# Patient Record
Sex: FEMALE | Race: WHITE | NOT HISPANIC OR LATINO | Employment: OTHER | URBAN - NONMETROPOLITAN AREA
[De-identification: names, ages, dates, MRNs, and addresses within clinical notes are randomized per-mention and may not be internally consistent; named-entity substitution may affect disease eponyms.]

---

## 2020-09-02 ENCOUNTER — OFFICE VISIT (OUTPATIENT)
Dept: CARDIOLOGY | Facility: CLINIC | Age: 75
End: 2020-09-02

## 2020-09-02 VITALS
BODY MASS INDEX: 24.04 KG/M2 | SYSTOLIC BLOOD PRESSURE: 130 MMHG | DIASTOLIC BLOOD PRESSURE: 81 MMHG | OXYGEN SATURATION: 99 % | HEART RATE: 64 BPM | HEIGHT: 68 IN | WEIGHT: 158.6 LBS

## 2020-09-02 DIAGNOSIS — R06.02 SHORTNESS OF BREATH: ICD-10-CM

## 2020-09-02 DIAGNOSIS — R42 DIZZINESS: ICD-10-CM

## 2020-09-02 DIAGNOSIS — R07.2 PRECORDIAL PAIN: ICD-10-CM

## 2020-09-02 DIAGNOSIS — R00.2 PALPITATIONS: ICD-10-CM

## 2020-09-02 DIAGNOSIS — E78.2 MIXED HYPERLIPIDEMIA: Primary | ICD-10-CM

## 2020-09-02 PROBLEM — E03.9 HYPOTHYROIDISM: Status: ACTIVE | Noted: 2020-09-02

## 2020-09-02 PROBLEM — K21.9 GERD (GASTROESOPHAGEAL REFLUX DISEASE): Status: ACTIVE | Noted: 2020-09-02

## 2020-09-02 PROCEDURE — 99204 OFFICE O/P NEW MOD 45 MIN: CPT | Performed by: INTERNAL MEDICINE

## 2020-09-02 PROCEDURE — 93000 ELECTROCARDIOGRAM COMPLETE: CPT | Performed by: INTERNAL MEDICINE

## 2020-09-02 RX ORDER — PANTOPRAZOLE SODIUM 40 MG/1
40 TABLET, DELAYED RELEASE ORAL DAILY PRN
COMMUNITY

## 2020-09-02 RX ORDER — ASPIRIN 81 MG/1
81 TABLET ORAL NIGHTLY PRN
COMMUNITY

## 2020-09-02 RX ORDER — PRAVASTATIN SODIUM 40 MG
TABLET ORAL NIGHTLY
COMMUNITY
Start: 2020-06-29

## 2020-09-02 RX ORDER — LEVOTHYROXINE SODIUM 0.1 MG/1
TABLET ORAL DAILY
COMMUNITY
Start: 2020-06-29

## 2020-09-02 RX ORDER — CYANOCOBALAMIN 1000 UG/ML
INJECTION, SOLUTION INTRAMUSCULAR; SUBCUTANEOUS
COMMUNITY
Start: 2020-08-11

## 2020-09-02 NOTE — PROGRESS NOTES
Subjective   Maricruz Bautista is a 75 y.o. female     Chief Complaint   Patient presents with   • Establish Care     Here for eval.   • Shortness of Breath   • Palpitations       PROBLEM LIST:     1. Shortness of breath  2. Palpitations  3. Hyperlipidemia  4. Hypothyroidism  5. GERD  6. Chest pain/pressure/sharp    Denies scarlet/rheumatic fever      Specialty Problems     None            HPI:  Ms. Maricruz Bautista is a 75-year-old female patient of [facility] seen today for evaluation of chest pain and palpitations.    Approximately 3 to 4 weeks ago the patient developed a sinus infection which was associated with an earache and imbalance.  At that time she developed chest discomfort.  She describes a retrosternal pressure-like discomfort which was not associated with nausea, diaphoresis, or shortness of breath.  Ms. Bautista noted her symptoms mostly when she was coughing or moving rapidly.  She was treated for her sinus infection and all of her symptoms resolved.  The patient describes having had similar symptoms with episodes of sinus infection in the past.    Prior to several weeks ago the patient was in her usual state of health and was able to mow her lawn perform all housework and yard work without difficulty.  In particular, she never has had exertional chest discomfort.  She also denies orthopnea, PND, and she describes only very mild lower extremity edema present only at the end of the day when she has been standing for prolonged periods of time.  Although the patient has by history of palpitations she describes no palpitations currently.  She has dizziness and perhaps one episode of vertigo but these symptoms only occur with sinus infections.  The patient has had no symptoms of TIA, stroke, or other symptoms of arterial embolic events and she describes no claudication or other symptoms of peripheral arterial disease.    Ms. Bautista describes having a chemical stress test done for 5 years ago in [facility]  for similar symptoms, and she was told she had no evidence of ischemia.                        PRIOR MEDICATIONS    Current Outpatient Medications on File Prior to Visit   Medication Sig Dispense Refill   • Ascorbic Acid (VITAMIN C PO) Take 2,000 mg by mouth Daily.     • aspirin (aspirin) 81 MG EC tablet Take 81 mg by mouth At Night As Needed (for sleep).     • cyanocobalamin 1000 MCG/ML injection Every 30 (Thirty) Days.     • levothyroxine (SYNTHROID, LEVOTHROID) 100 MCG tablet Daily.     • pantoprazole (PROTONIX) 40 MG EC tablet Take 40 mg by mouth Daily As Needed.     • pravastatin (PRAVACHOL) 40 MG tablet Every Night.       No current facility-administered medications on file prior to visit.        ALLERGIES:    Codeine and Sulfa antibiotics    PAST MEDICAL HISTORY:    Past Medical History:   Diagnosis Date   • GERD (gastroesophageal reflux disease)    • Hyperlipidemia    • Hypothyroidism        SURGICAL HISTORY:    Past Surgical History:   Procedure Laterality Date   • APPENDECTOMY     • CYST REMOVAL      ovary   • HYSTERECTOMY     • OTHER SURGICAL HISTORY      removed fluid that had collected on outside of kidneys   • TUBAL ABDOMINAL LIGATION         SOCIAL HISTORY:    Social History     Socioeconomic History   • Marital status:      Spouse name: Not on file   • Number of children: Not on file   • Years of education: Not on file   • Highest education level: Not on file   Tobacco Use   • Smoking status: Former Smoker   • Smokeless tobacco: Never Used   Substance and Sexual Activity   • Alcohol use: Never     Frequency: Never   • Drug use: Never       FAMILY HISTORY:    Family History   Problem Relation Age of Onset   • Hypertension Mother    • Hyperlipidemia Mother    • Heart failure Mother    • Heart attack Father    • Hyperlipidemia Father        Review of Systems   Constitutional: Positive for diaphoresis (wakes up at night with it) and fatigue (if doesn't get B12 injection). Negative for chills,  "fever and unexpected weight change.   HENT:        Sinus issues   Eyes: Positive for visual disturbance (wears glasses).   Respiratory: Positive for shortness of breath.         Denies orthopnea/PND  Confirms can climb a flight of stairs without difficulty   Cardiovascular: Positive for palpitations and leg swelling (if up to long, quickly resolves with elevating feet). Negative for chest pain.   Gastrointestinal: Negative for blood in stool (no melena,hemoptysis,hematochezia), constipation and diarrhea.   Endocrine: Positive for cold intolerance.   Genitourinary: Negative.    Musculoskeletal: Positive for arthralgias and myalgias.        Denies leg cramps with ambulation   Skin: Negative.    Allergic/Immunologic: Positive for environmental allergies.   Neurological: Positive for dizziness (r/t illness) and headaches (r/t sinuses).        Denies stroke like sx's   Hematological: Bruises/bleeds easily (both).   Psychiatric/Behavioral: Negative.        VISIT VITALS:  Vitals:    09/02/20 1044 09/02/20 1055   BP: 140/71 130/81   BP Location: Left arm Left arm   Patient Position: Sitting Sitting   Pulse: 64    SpO2: 99%    Weight: 71.9 kg (158 lb 9.6 oz)    Height: 172.7 cm (68\")       /81 (BP Location: Left arm, Patient Position: Sitting)   Pulse 64   Ht 172.7 cm (68\")   Wt 71.9 kg (158 lb 9.6 oz)   SpO2 99%   BMI 24.12 kg/m²     RECENT LABS:    Objective       Physical Exam   Constitutional: She is oriented to person, place, and time. She appears well-developed and well-nourished. No distress.   HENT:   Head: Normocephalic and atraumatic.   Eyes: Pupils are equal, round, and reactive to light. Conjunctivae and EOM are normal.   No ptosis or lid lag  Extra ocular motions intact  MAYANK   Neck: Normal range of motion. Neck supple. No hepatojugular reflux and no JVD present. Carotid bruit is not present. No tracheal deviation present.   Nl. Carotid upstrokes   Cardiovascular: Normal rate, regular rhythm, S1 " normal, S2 normal, normal heart sounds and intact distal pulses. Exam reveals no S3, no S4 and no friction rub.   No murmur heard.  Pulses:       Radial pulses are 2+ on the right side, and 2+ on the left side.   No click   Pulmonary/Chest: Effort normal and breath sounds normal. She has no wheezes. She has no rhonchi. She has no rales.   Nl. Expir. Phase  Nl. Breath sound intensity   Abdominal: Soft. Bowel sounds are normal. She exhibits no distension, no abdominal bruit and no mass. There is no tenderness. There is no rebound and no guarding.   No organomegaly   Musculoskeletal: Normal range of motion. She exhibits no edema, tenderness or deformity.   BLE, no edema, palpable pedal pulses, nl. Cap. refills     Neurological: She is alert and oriented to person, place, and time.   Skin: Skin is warm and dry. No rash noted. No erythema. No pallor.   Psychiatric: She has a normal mood and affect. Her behavior is normal. Judgment and thought content normal.   Nursing note and vitals reviewed.        ECG 12 Lead  Date/Time: 9/2/2020 11:54 AM  Performed by: Rashid Jordan MD  Authorized by: Rahsid Jordan MD   Comparison: not compared with previous ECG   Previous ECG: no previous ECG available  Comments: Sinus bradycardia at 58 with first-degree AV block.  RSR prime in V1 and V2.  No prior for comparison.              Assessment/Plan   #1.  Symptom complex of chest discomfort and dizziness which occurs only in the setting of acute sinus infection.  Ms. Bautista routinely functions a class I and II levels of activity without any symptoms of ischemia, and she has minimal risk factors for coronary artery disease.  Therefore, I do not think that further wrist ratification from the standpoint of ischemia is indicated at this time unless symptoms change, or if she develops similar symptoms in the absence of sinus infection or other known precipitating factors.    2.  Dizziness.  I believe this is middle and inner ear  dysfunction as the patient has symptoms only during sinus  infections and therefore I do not think that further evaluation of her cardiac component is indicated.    3.  History of palpitations.  This patient describes none currently.  The palpitations were to recur the patient would need to be evaluated for paroxysmal atrial fibrillation as age and gender would require anticoagulation for stroke prophylaxis.    4.  I have asked Ms. Bautista to follow with Jennifer Quintanilla as instructed and we will see her back in our office on a as needed basis for any symptoms as discussed in detail today and or as described above.   Diagnosis Plan   1. Mixed hyperlipidemia     2. Palpitations     3. Shortness of breath     4. Precordial pain         No follow-ups on file.         Maricruz Bautista  reports that she has quit smoking. She has never used smokeless tobacco.. I have educated her on the risk of diseases from using tobacco products such as cancer, COPD and heart diease.               Patient's Body mass index is 24.12 kg/m². BMI is within normal parameters. No follow-up required..       Jacqueline Coffman LPN    Scribed for Dr. Rashid Jordan by Jacqueline Coffman LPN September 2, 2020 11:45         Electronically signed by:            This note is dictated utilizing voice recognition software.  Although this record has been proof read, transcriptional errors may still be present. If questions occur regarding the content of this record please do not hesitate to call our office.